# Patient Record
Sex: MALE | Race: ASIAN
[De-identification: names, ages, dates, MRNs, and addresses within clinical notes are randomized per-mention and may not be internally consistent; named-entity substitution may affect disease eponyms.]

---

## 2020-12-18 ENCOUNTER — HOSPITAL ENCOUNTER (INPATIENT)
Dept: HOSPITAL 8 - ED | Age: 20
LOS: 12 days | Discharge: HOME | DRG: 853 | End: 2020-12-30
Attending: INTERNAL MEDICINE | Admitting: HOSPITALIST
Payer: COMMERCIAL

## 2020-12-18 VITALS — WEIGHT: 94.8 LBS | BODY MASS INDEX: 15.24 KG/M2 | HEIGHT: 66 IN

## 2020-12-18 DIAGNOSIS — Z82.49: ICD-10-CM

## 2020-12-18 DIAGNOSIS — J18.8: ICD-10-CM

## 2020-12-18 DIAGNOSIS — A41.9: Primary | ICD-10-CM

## 2020-12-18 DIAGNOSIS — C80.1: ICD-10-CM

## 2020-12-18 DIAGNOSIS — C79.9: ICD-10-CM

## 2020-12-18 DIAGNOSIS — Z94.81: ICD-10-CM

## 2020-12-18 DIAGNOSIS — Z87.74: ICD-10-CM

## 2020-12-18 DIAGNOSIS — Z20.828: ICD-10-CM

## 2020-12-18 DIAGNOSIS — D49.89: ICD-10-CM

## 2020-12-18 DIAGNOSIS — C83.30: ICD-10-CM

## 2020-12-18 DIAGNOSIS — D64.9: ICD-10-CM

## 2020-12-18 LAB
<PLATELET ESTIMATE>: ADEQUATE
<PLT MORPHOLOGY>: (no result)
ALBUMIN SERPL-MCNC: 2 G/DL (ref 3.4–5)
ALP SERPL-CCNC: 57 U/L (ref 45–117)
ALT SERPL-CCNC: 11 U/L (ref 12–78)
ANION GAP SERPL CALC-SCNC: 6 MMOL/L (ref 5–15)
ANISOCYTOSIS BLD QL SMEAR: (no result)
BAND#(MANUAL): 1.3 X10^3/UL
BILIRUB SERPL-MCNC: 0.3 MG/DL (ref 0.2–1)
CALCIUM SERPL-MCNC: 7.6 MG/DL (ref 8.5–10.1)
CHLORIDE SERPL-SCNC: 110 MMOL/L (ref 98–107)
CREAT SERPL-MCNC: 0.75 MG/DL (ref 0.7–1.3)
DOHLE BOD BLD QL SMEAR: (no result)
ERYTHROCYTE [DISTWIDTH] IN BLOOD BY AUTOMATED COUNT: 12.3 % (ref 9.4–14.8)
LYMPH#(MANUAL): 0.13 X10^3/UL (ref 1–6.1)
LYMPHS% (MANUAL): 2 % (ref 22–44)
MCH RBC QN AUTO: 27.9 PG (ref 27.5–34.5)
MCHC RBC AUTO-ENTMCNC: 33.1 G/DL (ref 33.2–36.2)
MD: YES
METAMYELOCYTES# (MANUAL): 0.39 X10^3/UL (ref 0–0)
METAMYELOCYTES% (MANUAL): 6 % (ref 0–1)
MICROSCOPIC: (no result)
MONOS#(MANUAL): 0.59 X10^3/UL (ref 0.3–2.7)
MONOS% (MANUAL): 9 % (ref 2–9)
NEUTS BAND NFR BLD: 20 % (ref 0–7)
PLATELET # BLD AUTO: 315 X10^3/UL (ref 130–400)
PMV BLD AUTO: 7.1 FL (ref 7.4–10.4)
POLYCHROMASIA BLD QL SMEAR: (no result)
PROT SERPL-MCNC: 4.7 G/DL (ref 6.4–8.2)
RBC # BLD AUTO: 4.38 X10^6/UL (ref 4.38–5.82)
REACTIVE LYMPHS # (MANUAL): 0.2 X10^3/UL (ref 0–0)
REACTIVE LYMPHS % (MANUAL): 3 % (ref 0–0)
SEG#(MANUAL): 3.9 X10^3/UL (ref 1.8–8)
SEGS% (MANUAL): 60 % (ref 42–75)

## 2020-12-18 PROCEDURE — 88305 TISSUE EXAM BY PATHOLOGIST: CPT

## 2020-12-18 PROCEDURE — 36415 COLL VENOUS BLD VENIPUNCTURE: CPT

## 2020-12-18 PROCEDURE — 88341 IMHCHEM/IMCYTCHM EA ADD ANTB: CPT

## 2020-12-18 PROCEDURE — 77012 CT SCAN FOR NEEDLE BIOPSY: CPT

## 2020-12-18 PROCEDURE — 88280 CHROMOSOME KARYOTYPE STUDY: CPT

## 2020-12-18 PROCEDURE — 87324 CLOSTRIDIUM AG IA: CPT

## 2020-12-18 PROCEDURE — 88264 CHROMOSOME ANALYSIS 20-25: CPT

## 2020-12-18 PROCEDURE — 70551 MRI BRAIN STEM W/O DYE: CPT

## 2020-12-18 PROCEDURE — 89051 BODY FLUID CELL COUNT: CPT

## 2020-12-18 PROCEDURE — 86140 C-REACTIVE PROTEIN: CPT

## 2020-12-18 PROCEDURE — 93306 TTE W/DOPPLER COMPLETE: CPT

## 2020-12-18 PROCEDURE — 87517 HEPATITIS B DNA QUANT: CPT

## 2020-12-18 PROCEDURE — 83735 ASSAY OF MAGNESIUM: CPT

## 2020-12-18 PROCEDURE — 80074 ACUTE HEPATITIS PANEL: CPT

## 2020-12-18 PROCEDURE — 83615 LACTATE (LD) (LDH) ENZYME: CPT

## 2020-12-18 PROCEDURE — 71045 X-RAY EXAM CHEST 1 VIEW: CPT

## 2020-12-18 PROCEDURE — 36573 INSJ PICC RS&I 5 YR+: CPT

## 2020-12-18 PROCEDURE — 88342 IMHCHEM/IMCYTCHM 1ST ANTB: CPT

## 2020-12-18 PROCEDURE — 82272 OCCULT BLD FECES 1-3 TESTS: CPT

## 2020-12-18 PROCEDURE — 62328 DX LMBR SPI PNXR W/FLUOR/CT: CPT

## 2020-12-18 PROCEDURE — 71275 CT ANGIOGRAPHY CHEST: CPT

## 2020-12-18 PROCEDURE — 81003 URINALYSIS AUTO W/O SCOPE: CPT

## 2020-12-18 PROCEDURE — 99156 MOD SED OTH PHYS/QHP 5/>YRS: CPT

## 2020-12-18 PROCEDURE — 84100 ASSAY OF PHOSPHORUS: CPT

## 2020-12-18 PROCEDURE — 84157 ASSAY OF PROTEIN OTHER: CPT

## 2020-12-18 PROCEDURE — 70543 MRI ORBT/FAC/NCK W/O &W/DYE: CPT

## 2020-12-18 PROCEDURE — 85651 RBC SED RATE NONAUTOMATED: CPT

## 2020-12-18 PROCEDURE — 84550 ASSAY OF BLOOD/URIC ACID: CPT

## 2020-12-18 PROCEDURE — C1751 CATH, INF, PER/CENT/MIDLINE: HCPCS

## 2020-12-18 PROCEDURE — 85610 PROTHROMBIN TIME: CPT

## 2020-12-18 PROCEDURE — A9575 INJ GADOTERATE MEGLUMI 0.1ML: HCPCS

## 2020-12-18 PROCEDURE — 88311 DECALCIFY TISSUE: CPT

## 2020-12-18 PROCEDURE — 85730 THROMBOPLASTIN TIME PARTIAL: CPT

## 2020-12-18 PROCEDURE — 85097 BONE MARROW INTERPRETATION: CPT

## 2020-12-18 PROCEDURE — 87040 BLOOD CULTURE FOR BACTERIA: CPT

## 2020-12-18 PROCEDURE — 88360 TUMOR IMMUNOHISTOCHEM/MANUAL: CPT

## 2020-12-18 PROCEDURE — 83605 ASSAY OF LACTIC ACID: CPT

## 2020-12-18 PROCEDURE — 82105 ALPHA-FETOPROTEIN SERUM: CPT

## 2020-12-18 PROCEDURE — G0475 HIV COMBINATION ASSAY: HCPCS

## 2020-12-18 PROCEDURE — 85060 BLOOD SMEAR INTERPRETATION: CPT

## 2020-12-18 PROCEDURE — 38222 DX BONE MARROW BX & ASPIR: CPT

## 2020-12-18 PROCEDURE — 10005 FNA BX W/US GDN 1ST LES: CPT

## 2020-12-18 PROCEDURE — 87806 HIV AG W/HIV1&2 ANTB W/OPTIC: CPT

## 2020-12-18 PROCEDURE — 85025 COMPLETE CBC W/AUTO DIFF WBC: CPT

## 2020-12-18 PROCEDURE — 84702 CHORIONIC GONADOTROPIN TEST: CPT

## 2020-12-18 PROCEDURE — U0003 INFECTIOUS AGENT DETECTION BY NUCLEIC ACID (DNA OR RNA); SEVERE ACUTE RESPIRATORY SYNDROME CORONAVIRUS 2 (SARS-COV-2) (CORONAVIRUS DISEASE [COVID-19]), AMPLIFIED PROBE TECHNIQUE, MAKING USE OF HIGH THROUGHPUT TECHNOLOGIES AS DESCRIBED BY CMS-2020-01-R: HCPCS

## 2020-12-18 PROCEDURE — 99157 MOD SED OTHER PHYS/QHP EA: CPT

## 2020-12-18 PROCEDURE — 85379 FIBRIN DEGRADATION QUANT: CPT

## 2020-12-18 PROCEDURE — 88313 SPECIAL STAINS GROUP 2: CPT

## 2020-12-18 PROCEDURE — 80053 COMPREHEN METABOLIC PANEL: CPT

## 2020-12-18 PROCEDURE — 88237 TISSUE CULTURE BONE MARROW: CPT

## 2020-12-18 PROCEDURE — 70481 CT ORBIT/EAR/FOSSA W/DYE: CPT

## 2020-12-18 PROCEDURE — 84145 PROCALCITONIN (PCT): CPT

## 2020-12-18 PROCEDURE — 88108 CYTOPATH CONCENTRATE TECH: CPT

## 2020-12-18 PROCEDURE — 80048 BASIC METABOLIC PNL TOTAL CA: CPT

## 2020-12-18 PROCEDURE — 82945 GLUCOSE OTHER FLUID: CPT

## 2020-12-18 RX ADMIN — CEFTRIAXONE SCH MLS/HR: 1 INJECTION, SOLUTION INTRAVENOUS at 20:00

## 2020-12-18 RX ADMIN — AZITHROMYCIN FOR INJECTION INJECTION, POWDER, LYOPHILIZED, FOR SOLUTION SCH MLS/HR: 500 INJECTION INTRAVENOUS at 20:00

## 2020-12-18 RX ADMIN — SODIUM CHLORIDE SCH MLS/HR: 0.9 INJECTION, SOLUTION INTRAVENOUS at 20:47

## 2020-12-18 NOTE — NUR
PT RESTING IN BED, PT ON MONITOR WITH PT FAMILY AT PT SIDE. PT VSS. PT DENIED 
ANY WANTS OR NEEDS AT THIS TIME.

## 2020-12-18 NOTE — NUR
PT RESTING IN BED WITH PT FAMILY AT HIS SIDE. PT IS IN BATHROOM NOW TRYING TO 
PROVIDE URINE SAMPLE.

## 2020-12-19 LAB
<PLATELET ESTIMATE>: ADEQUATE
<PLT MORPHOLOGY>: (no result)
ANION GAP SERPL CALC-SCNC: 6 MMOL/L (ref 5–15)
ANISOCYTOSIS BLD QL SMEAR: (no result)
BAND#(MANUAL): 1.08 X10^3/UL
CALCIUM SERPL-MCNC: 8.2 MG/DL (ref 8.5–10.1)
CHLORIDE SERPL-SCNC: 109 MMOL/L (ref 98–107)
CREAT SERPL-MCNC: 0.65 MG/DL (ref 0.7–1.3)
CRP SERPL-MCNC: 6.7 MG/DL (ref 0.02–0.49)
D DIMER PPP FEU-MCNC: 8.22 UG/MLFEU (ref 0–0.52)
DOHLE BOD BLD QL SMEAR: (no result)
ERYTHROCYTE [DISTWIDTH] IN BLOOD BY AUTOMATED COUNT: 12.4 % (ref 9.4–14.8)
INR PPP: 1.11 (ref 0.93–1.1)
LYMPH#(MANUAL): 0.3 X10^3/UL (ref 1–6.1)
LYMPHS% (MANUAL): 5 % (ref 22–44)
MCH RBC QN AUTO: 28.7 PG (ref 27.5–34.5)
MCHC RBC AUTO-ENTMCNC: 34.2 G/DL (ref 33.2–36.2)
MD: YES
METAMYELOCYTES# (MANUAL): 0.12 X10^3/UL (ref 0–0)
METAMYELOCYTES% (MANUAL): 2 % (ref 0–1)
MONOS#(MANUAL): 0.54 X10^3/UL (ref 0.3–2.7)
MONOS% (MANUAL): 9 % (ref 2–9)
NEUTS BAND NFR BLD: 18 % (ref 0–7)
PLATELET # BLD AUTO: 330 X10^3/UL (ref 130–400)
PMV BLD AUTO: 7.3 FL (ref 7.4–10.4)
POLYCHROMASIA BLD QL SMEAR: (no result)
PROTHROMBIN TIME: 11.8 SECONDS (ref 9.6–11.5)
RBC # BLD AUTO: 3.99 X10^6/UL (ref 4.38–5.82)
REACTIVE LYMPHS # (MANUAL): 0.18 X10^3/UL (ref 0–0)
REACTIVE LYMPHS % (MANUAL): 3 % (ref 0–0)
SEG#(MANUAL): 3.78 X10^3/UL (ref 1.8–8)
SEGS% (MANUAL): 63 % (ref 42–75)

## 2020-12-19 PROCEDURE — 07B23ZX EXCISION OF LEFT NECK LYMPHATIC, PERCUTANEOUS APPROACH, DIAGNOSTIC: ICD-10-PCS | Performed by: RADIOLOGY

## 2020-12-19 RX ADMIN — SODIUM CHLORIDE, SODIUM LACTATE, POTASSIUM CHLORIDE, AND CALCIUM CHLORIDE SCH MLS/HR: .6; .31; .03; .02 INJECTION, SOLUTION INTRAVENOUS at 17:21

## 2020-12-19 RX ADMIN — CEFTRIAXONE SCH MLS/HR: 1 INJECTION, SOLUTION INTRAVENOUS at 20:14

## 2020-12-19 RX ADMIN — AZITHROMYCIN FOR INJECTION INJECTION, POWDER, LYOPHILIZED, FOR SOLUTION SCH MLS/HR: 500 INJECTION INTRAVENOUS at 20:00

## 2020-12-19 RX ADMIN — AMPICILLIN SODIUM AND SULBACTAM SODIUM SCH MLS/HR: 2; 1 INJECTION, POWDER, FOR SOLUTION INTRAMUSCULAR; INTRAVENOUS at 12:30

## 2020-12-19 RX ADMIN — SODIUM CHLORIDE SCH MLS/HR: 0.9 INJECTION, SOLUTION INTRAVENOUS at 06:00

## 2020-12-19 RX ADMIN — AMPICILLIN SODIUM AND SULBACTAM SODIUM SCH MLS/HR: 2; 1 INJECTION, POWDER, FOR SOLUTION INTRAMUSCULAR; INTRAVENOUS at 18:30

## 2020-12-19 RX ADMIN — SODIUM CHLORIDE, SODIUM LACTATE, POTASSIUM CHLORIDE, AND CALCIUM CHLORIDE SCH MLS/HR: .6; .31; .03; .02 INJECTION, SOLUTION INTRAVENOUS at 10:39

## 2020-12-19 RX ADMIN — SODIUM CHLORIDE, SODIUM LACTATE, POTASSIUM CHLORIDE, AND CALCIUM CHLORIDE SCH MLS/HR: .6; .31; .03; .02 INJECTION, SOLUTION INTRAVENOUS at 20:30

## 2020-12-19 RX ADMIN — DOCUSATE SODIUM 50MG AND SENNOSIDES 8.6MG SCH TAB: 8.6; 5 TABLET, FILM COATED ORAL at 09:27

## 2020-12-19 NOTE — NUR
SPOKE WITH FAMILY AND PT AT LENGTH ABOUT HOSPITAL COURSE. HOSPITALIST [place]RIST[place] TO 
SPEAK WITH FAMILY AS WELL.

## 2020-12-19 NOTE — NUR
First contact with pt, pt back from br ambulatory. Connected back to b/p, pulse 
ox. Call bell in reach, ivf reconnected. Pt now NPO. AIDET provided, will 
continue to monitor.

## 2020-12-19 NOTE — NUR
PT DENIES PAIN AT THIS TIME. RIGHT SIDE OF FACE SWOLLEN. STATES HIS MOUTH IS 
DRY. AWAITING TO HEAR FROM IR IF BIOPSY TO BE DONE TODAY, NPO.

## 2020-12-20 VITALS — DIASTOLIC BLOOD PRESSURE: 69 MMHG | SYSTOLIC BLOOD PRESSURE: 115 MMHG

## 2020-12-20 VITALS — DIASTOLIC BLOOD PRESSURE: 67 MMHG | SYSTOLIC BLOOD PRESSURE: 103 MMHG

## 2020-12-20 LAB
<PLATELET ESTIMATE>: ADEQUATE
<PLT MORPHOLOGY>: (no result)
ANION GAP SERPL CALC-SCNC: 6 MMOL/L (ref 5–15)
ANISOCYTOSIS BLD QL SMEAR: (no result)
BAND#(MANUAL): 1.19 X10^3/UL
CALCIUM SERPL-MCNC: 8.3 MG/DL (ref 8.5–10.1)
CHLORIDE SERPL-SCNC: 105 MMOL/L (ref 98–107)
CREAT SERPL-MCNC: 0.76 MG/DL (ref 0.7–1.3)
DOHLE BOD BLD QL SMEAR: (no result)
ERYTHROCYTE [DISTWIDTH] IN BLOOD BY AUTOMATED COUNT: 12.9 % (ref 9.4–14.8)
LYMPH#(MANUAL): 0.53 X10^3/UL (ref 1–6.1)
LYMPHS% (MANUAL): 8 % (ref 22–44)
MCH RBC QN AUTO: 28.1 PG (ref 27.5–34.5)
MCHC RBC AUTO-ENTMCNC: 33.6 G/DL (ref 33.2–36.2)
MD: YES
METAMYELOCYTES# (MANUAL): 0.13 X10^3/UL (ref 0–0)
METAMYELOCYTES% (MANUAL): 2 % (ref 0–1)
MONOS#(MANUAL): 0.59 X10^3/UL (ref 0.3–2.7)
MONOS% (MANUAL): 9 % (ref 2–9)
NEUTS BAND NFR BLD: 18 % (ref 0–7)
PLATELET # BLD AUTO: 334 X10^3/UL (ref 130–400)
PMV BLD AUTO: 6.9 FL (ref 7.4–10.4)
POLYCHROMASIA BLD QL SMEAR: (no result)
RBC # BLD AUTO: 4.05 X10^6/UL (ref 4.38–5.82)
REACTIVE LYMPHS # (MANUAL): 0.13 X10^3/UL (ref 0–0)
REACTIVE LYMPHS % (MANUAL): 2 % (ref 0–0)
SEG#(MANUAL): 4.03 X10^3/UL (ref 1.8–8)
SEGS% (MANUAL): 61 % (ref 42–75)

## 2020-12-20 RX ADMIN — FLUTICASONE PROPIONATE SCH SPRAY: 50 SPRAY, METERED NASAL at 10:00

## 2020-12-20 RX ADMIN — AMPICILLIN SODIUM AND SULBACTAM SODIUM SCH MLS/HR: 2; 1 INJECTION, POWDER, FOR SOLUTION INTRAMUSCULAR; INTRAVENOUS at 18:13

## 2020-12-20 RX ADMIN — AMPICILLIN SODIUM AND SULBACTAM SODIUM SCH MLS/HR: 2; 1 INJECTION, POWDER, FOR SOLUTION INTRAMUSCULAR; INTRAVENOUS at 13:08

## 2020-12-20 RX ADMIN — AMPICILLIN SODIUM AND SULBACTAM SODIUM SCH MLS/HR: 2; 1 INJECTION, POWDER, FOR SOLUTION INTRAMUSCULAR; INTRAVENOUS at 00:53

## 2020-12-20 RX ADMIN — SODIUM CHLORIDE, SODIUM LACTATE, POTASSIUM CHLORIDE, AND CALCIUM CHLORIDE SCH MLS/HR: .6; .31; .03; .02 INJECTION, SOLUTION INTRAVENOUS at 01:30

## 2020-12-20 RX ADMIN — CEFTRIAXONE SCH MLS/HR: 1 INJECTION, SOLUTION INTRAVENOUS at 22:12

## 2020-12-20 RX ADMIN — AZITHROMYCIN FOR INJECTION INJECTION, POWDER, LYOPHILIZED, FOR SOLUTION SCH MLS/HR: 500 INJECTION INTRAVENOUS at 19:45

## 2020-12-20 RX ADMIN — AMPICILLIN SODIUM AND SULBACTAM SODIUM SCH MLS/HR: 2; 1 INJECTION, POWDER, FOR SOLUTION INTRAMUSCULAR; INTRAVENOUS at 06:42

## 2020-12-20 RX ADMIN — ACETAMINOPHEN PRN MG: 325 TABLET, FILM COATED ORAL at 20:58

## 2020-12-20 RX ADMIN — DOCUSATE SODIUM 50MG AND SENNOSIDES 8.6MG SCH TAB: 8.6; 5 TABLET, FILM COATED ORAL at 09:00

## 2020-12-20 NOTE — NUR
PT COMPLETED SHOWER AND ADL'S INDEPENDENTLY W/O DIFFICULTY.  RTD TO ROOM, 
BREAKFAST RAY PROVIDED.  CALL LIGHT W/I REACH

## 2020-12-21 VITALS — SYSTOLIC BLOOD PRESSURE: 105 MMHG | DIASTOLIC BLOOD PRESSURE: 68 MMHG

## 2020-12-21 VITALS — SYSTOLIC BLOOD PRESSURE: 96 MMHG | DIASTOLIC BLOOD PRESSURE: 64 MMHG

## 2020-12-21 VITALS — SYSTOLIC BLOOD PRESSURE: 111 MMHG | DIASTOLIC BLOOD PRESSURE: 69 MMHG

## 2020-12-21 VITALS — DIASTOLIC BLOOD PRESSURE: 65 MMHG | SYSTOLIC BLOOD PRESSURE: 100 MMHG

## 2020-12-21 RX ADMIN — AMPICILLIN SODIUM AND SULBACTAM SODIUM SCH MLS/HR: 2; 1 INJECTION, POWDER, FOR SOLUTION INTRAMUSCULAR; INTRAVENOUS at 00:07

## 2020-12-21 RX ADMIN — AMPICILLIN SODIUM AND SULBACTAM SODIUM SCH MLS/HR: 2; 1 INJECTION, POWDER, FOR SOLUTION INTRAMUSCULAR; INTRAVENOUS at 12:47

## 2020-12-21 RX ADMIN — AMPICILLIN SODIUM AND SULBACTAM SODIUM SCH MLS/HR: 2; 1 INJECTION, POWDER, FOR SOLUTION INTRAMUSCULAR; INTRAVENOUS at 17:51

## 2020-12-21 RX ADMIN — AMPICILLIN SODIUM AND SULBACTAM SODIUM SCH MLS/HR: 2; 1 INJECTION, POWDER, FOR SOLUTION INTRAMUSCULAR; INTRAVENOUS at 06:04

## 2020-12-21 RX ADMIN — FLUTICASONE PROPIONATE SCH SPRAY: 50 SPRAY, METERED NASAL at 10:11

## 2020-12-21 RX ADMIN — DOCUSATE SODIUM 50MG AND SENNOSIDES 8.6MG SCH TAB: 8.6; 5 TABLET, FILM COATED ORAL at 09:00

## 2020-12-22 VITALS — DIASTOLIC BLOOD PRESSURE: 52 MMHG | SYSTOLIC BLOOD PRESSURE: 97 MMHG

## 2020-12-22 VITALS — DIASTOLIC BLOOD PRESSURE: 64 MMHG | SYSTOLIC BLOOD PRESSURE: 98 MMHG

## 2020-12-22 VITALS — DIASTOLIC BLOOD PRESSURE: 70 MMHG | SYSTOLIC BLOOD PRESSURE: 110 MMHG

## 2020-12-22 VITALS — SYSTOLIC BLOOD PRESSURE: 100 MMHG | DIASTOLIC BLOOD PRESSURE: 62 MMHG

## 2020-12-22 LAB
<PLATELET ESTIMATE>: ADEQUATE
<PLT MORPHOLOGY>: (no result)
ANISOCYTOSIS BLD QL SMEAR: (no result)
BAND#(MANUAL): 1.14 X10^3/UL
BASOPHILS NFR BLD MANUAL: 1 % (ref 0–1)
BASOS#(MANUAL): 0.07 X10^3/UL (ref 0–0.3)
CLOSTRIDIUM DIFFICILE ANTIGEN: NEGATIVE
CLOSTRIDIUM DIFFICILE TOXIN: NEGATIVE
EOS#(MANUAL): 0.07 X10^3/UL (ref 0–0.8)
EOS% (MANUAL): 1 % (ref 1–7)
ERYTHROCYTE [DISTWIDTH] IN BLOOD BY AUTOMATED COUNT: 13 % (ref 9.4–14.8)
GFR SERPL CREATININE-BSD FRML MDRD: POSITIVE ML/MIN/{1.73_M2}
LYMPH#(MANUAL): 0.4 X10^3/UL (ref 1–6.1)
LYMPHS% (MANUAL): 6 % (ref 22–44)
MCH RBC QN AUTO: 28 PG (ref 27.5–34.5)
MCHC RBC AUTO-ENTMCNC: 33.1 G/DL (ref 33.2–36.2)
MD: YES
METAMYELOCYTES# (MANUAL): 0.07 X10^3/UL (ref 0–0)
METAMYELOCYTES% (MANUAL): 1 % (ref 0–1)
MONOS#(MANUAL): 0.34 X10^3/UL (ref 0.3–2.7)
MONOS% (MANUAL): 5 % (ref 2–9)
NEUTS BAND NFR BLD: 17 % (ref 0–7)
PLATELET # BLD AUTO: 365 X10^3/UL (ref 130–400)
PMV BLD AUTO: 7.4 FL (ref 7.4–10.4)
RBC # BLD AUTO: 4.25 X10^6/UL (ref 4.38–5.82)
SEG#(MANUAL): 4.62 X10^3/UL (ref 1.8–8)
SEGS% (MANUAL): 69 % (ref 42–75)

## 2020-12-22 RX ADMIN — DOCUSATE SODIUM 50MG AND SENNOSIDES 8.6MG SCH TAB: 8.6; 5 TABLET, FILM COATED ORAL at 09:00

## 2020-12-22 RX ADMIN — AMPICILLIN SODIUM AND SULBACTAM SODIUM SCH MLS/HR: 2; 1 INJECTION, POWDER, FOR SOLUTION INTRAMUSCULAR; INTRAVENOUS at 18:21

## 2020-12-22 RX ADMIN — LACTOBACILLUS TAB SCH TAB: TAB at 15:38

## 2020-12-22 RX ADMIN — FLUTICASONE PROPIONATE SCH SPRAY: 50 SPRAY, METERED NASAL at 09:09

## 2020-12-22 RX ADMIN — AMPICILLIN SODIUM AND SULBACTAM SODIUM SCH MLS/HR: 2; 1 INJECTION, POWDER, FOR SOLUTION INTRAMUSCULAR; INTRAVENOUS at 00:23

## 2020-12-22 RX ADMIN — ENOXAPARIN SODIUM SCH MG: 40 INJECTION SUBCUTANEOUS at 09:11

## 2020-12-22 RX ADMIN — AMPICILLIN SODIUM AND SULBACTAM SODIUM SCH MLS/HR: 2; 1 INJECTION, POWDER, FOR SOLUTION INTRAMUSCULAR; INTRAVENOUS at 12:38

## 2020-12-22 RX ADMIN — AMPICILLIN SODIUM AND SULBACTAM SODIUM SCH MLS/HR: 2; 1 INJECTION, POWDER, FOR SOLUTION INTRAMUSCULAR; INTRAVENOUS at 05:59

## 2020-12-22 RX ADMIN — LACTOBACILLUS TAB SCH TAB: TAB at 21:35

## 2020-12-23 VITALS — DIASTOLIC BLOOD PRESSURE: 61 MMHG | SYSTOLIC BLOOD PRESSURE: 99 MMHG

## 2020-12-23 VITALS — DIASTOLIC BLOOD PRESSURE: 66 MMHG | SYSTOLIC BLOOD PRESSURE: 97 MMHG

## 2020-12-23 VITALS — SYSTOLIC BLOOD PRESSURE: 100 MMHG | DIASTOLIC BLOOD PRESSURE: 65 MMHG

## 2020-12-23 LAB
<PLATELET ESTIMATE>: ADEQUATE
<PLT MORPHOLOGY>: (no result)
ALBUMIN SERPL-MCNC: 1.9 G/DL (ref 3.4–5)
ALP SERPL-CCNC: 58 U/L (ref 45–117)
ALT SERPL-CCNC: 17 U/L (ref 12–78)
ANION GAP SERPL CALC-SCNC: 3 MMOL/L (ref 5–15)
BAND#(MANUAL): 0.43 X10^3/UL
BILIRUB SERPL-MCNC: 0.5 MG/DL (ref 0.2–1)
CALCIUM SERPL-MCNC: 8.7 MG/DL (ref 8.5–10.1)
CHLORIDE SERPL-SCNC: 105 MMOL/L (ref 98–107)
CREAT SERPL-MCNC: 0.72 MG/DL (ref 0.7–1.3)
EOS#(MANUAL): 0.06 X10^3/UL (ref 0–0.8)
EOS% (MANUAL): 1 % (ref 1–7)
ERYTHROCYTE [DISTWIDTH] IN BLOOD BY AUTOMATED COUNT: 13 % (ref 9.4–14.8)
LYMPH#(MANUAL): 0.12 X10^3/UL (ref 1–6.1)
LYMPHS% (MANUAL): 2 % (ref 22–44)
MCH RBC QN AUTO: 28.5 PG (ref 27.5–34.5)
MCHC RBC AUTO-ENTMCNC: 34 G/DL (ref 33.2–36.2)
MD: YES
MONOS#(MANUAL): 0.43 X10^3/UL (ref 0.3–2.7)
MONOS% (MANUAL): 7 % (ref 2–9)
MYELOCYTES# (MANUAL): 0.12 X10^3/UL (ref 0–0)
MYELOCYTES% (MANUAL): 2 % (ref 0–0)
NEUTS BAND NFR BLD: 7 % (ref 0–7)
PLATELET # BLD AUTO: 341 X10^3/UL (ref 130–400)
PMV BLD AUTO: 7.3 FL (ref 7.4–10.4)
POLYCHROMASIA BLD QL SMEAR: (no result)
PROT SERPL-MCNC: 4.6 G/DL (ref 6.4–8.2)
RBC # BLD AUTO: 4.04 X10^6/UL (ref 4.38–5.82)
SEG#(MANUAL): 5.02 X10^3/UL (ref 1.8–8)
SEGS% (MANUAL): 81 % (ref 42–75)

## 2020-12-23 RX ADMIN — LACTOBACILLUS TAB SCH TAB: TAB at 09:41

## 2020-12-23 RX ADMIN — AMPICILLIN SODIUM AND SULBACTAM SODIUM SCH MLS/HR: 2; 1 INJECTION, POWDER, FOR SOLUTION INTRAMUSCULAR; INTRAVENOUS at 18:13

## 2020-12-23 RX ADMIN — AMPICILLIN SODIUM AND SULBACTAM SODIUM SCH MLS/HR: 2; 1 INJECTION, POWDER, FOR SOLUTION INTRAMUSCULAR; INTRAVENOUS at 23:58

## 2020-12-23 RX ADMIN — AMPICILLIN SODIUM AND SULBACTAM SODIUM SCH MLS/HR: 2; 1 INJECTION, POWDER, FOR SOLUTION INTRAMUSCULAR; INTRAVENOUS at 12:47

## 2020-12-23 RX ADMIN — AMPICILLIN SODIUM AND SULBACTAM SODIUM SCH MLS/HR: 2; 1 INJECTION, POWDER, FOR SOLUTION INTRAMUSCULAR; INTRAVENOUS at 06:31

## 2020-12-23 RX ADMIN — LACTOBACILLUS TAB SCH TAB: TAB at 19:45

## 2020-12-23 RX ADMIN — DOCUSATE SODIUM 50MG AND SENNOSIDES 8.6MG SCH TAB: 8.6; 5 TABLET, FILM COATED ORAL at 09:42

## 2020-12-23 RX ADMIN — AMPICILLIN SODIUM AND SULBACTAM SODIUM SCH MLS/HR: 2; 1 INJECTION, POWDER, FOR SOLUTION INTRAMUSCULAR; INTRAVENOUS at 00:21

## 2020-12-23 RX ADMIN — LACTOBACILLUS TAB SCH TAB: TAB at 16:58

## 2020-12-23 RX ADMIN — FLUTICASONE PROPIONATE SCH SPRAY: 50 SPRAY, METERED NASAL at 09:41

## 2020-12-23 RX ADMIN — ENOXAPARIN SODIUM SCH MG: 40 INJECTION SUBCUTANEOUS at 09:46

## 2020-12-24 VITALS — DIASTOLIC BLOOD PRESSURE: 66 MMHG | SYSTOLIC BLOOD PRESSURE: 101 MMHG

## 2020-12-24 VITALS — DIASTOLIC BLOOD PRESSURE: 68 MMHG | SYSTOLIC BLOOD PRESSURE: 104 MMHG

## 2020-12-24 VITALS — SYSTOLIC BLOOD PRESSURE: 110 MMHG | DIASTOLIC BLOOD PRESSURE: 73 MMHG

## 2020-12-24 VITALS — DIASTOLIC BLOOD PRESSURE: 63 MMHG | SYSTOLIC BLOOD PRESSURE: 105 MMHG

## 2020-12-24 PROCEDURE — 02HV33Z INSERTION OF INFUSION DEVICE INTO SUPERIOR VENA CAVA, PERCUTANEOUS APPROACH: ICD-10-PCS | Performed by: RADIOLOGY

## 2020-12-24 PROCEDURE — 07DR3ZX EXTRACTION OF ILIAC BONE MARROW, PERCUTANEOUS APPROACH, DIAGNOSTIC: ICD-10-PCS | Performed by: RADIOLOGY

## 2020-12-24 PROCEDURE — B548ZZA ULTRASONOGRAPHY OF SUPERIOR VENA CAVA, GUIDANCE: ICD-10-PCS | Performed by: RADIOLOGY

## 2020-12-24 PROCEDURE — B5181ZA FLUOROSCOPY OF SUPERIOR VENA CAVA USING LOW OSMOLAR CONTRAST, GUIDANCE: ICD-10-PCS | Performed by: RADIOLOGY

## 2020-12-24 RX ADMIN — LACTOBACILLUS TAB SCH TAB: TAB at 09:00

## 2020-12-24 RX ADMIN — LACTOBACILLUS TAB SCH TAB: TAB at 20:39

## 2020-12-24 RX ADMIN — ENOXAPARIN SODIUM SCH MG: 40 INJECTION SUBCUTANEOUS at 08:10

## 2020-12-24 RX ADMIN — ACETAMINOPHEN PRN MG: 325 TABLET, FILM COATED ORAL at 17:59

## 2020-12-24 RX ADMIN — AMPICILLIN SODIUM AND SULBACTAM SODIUM SCH MLS/HR: 2; 1 INJECTION, POWDER, FOR SOLUTION INTRAMUSCULAR; INTRAVENOUS at 12:07

## 2020-12-24 RX ADMIN — AMPICILLIN SODIUM AND SULBACTAM SODIUM SCH MLS/HR: 2; 1 INJECTION, POWDER, FOR SOLUTION INTRAMUSCULAR; INTRAVENOUS at 17:59

## 2020-12-24 RX ADMIN — LACTOBACILLUS TAB SCH TAB: TAB at 18:00

## 2020-12-24 RX ADMIN — FLUTICASONE PROPIONATE SCH SPRAY: 50 SPRAY, METERED NASAL at 09:00

## 2020-12-24 RX ADMIN — ALLOPURINOL SCH MG: 300 TABLET ORAL at 18:28

## 2020-12-24 RX ADMIN — DOCUSATE SODIUM 50MG AND SENNOSIDES 8.6MG SCH TAB: 8.6; 5 TABLET, FILM COATED ORAL at 09:00

## 2020-12-24 RX ADMIN — AMPICILLIN SODIUM AND SULBACTAM SODIUM SCH MLS/HR: 2; 1 INJECTION, POWDER, FOR SOLUTION INTRAMUSCULAR; INTRAVENOUS at 06:06

## 2020-12-25 VITALS — SYSTOLIC BLOOD PRESSURE: 102 MMHG | DIASTOLIC BLOOD PRESSURE: 60 MMHG

## 2020-12-25 VITALS — SYSTOLIC BLOOD PRESSURE: 119 MMHG | DIASTOLIC BLOOD PRESSURE: 67 MMHG

## 2020-12-25 VITALS — SYSTOLIC BLOOD PRESSURE: 112 MMHG | DIASTOLIC BLOOD PRESSURE: 65 MMHG

## 2020-12-25 VITALS — DIASTOLIC BLOOD PRESSURE: 66 MMHG | SYSTOLIC BLOOD PRESSURE: 107 MMHG

## 2020-12-25 LAB
<PLATELET ESTIMATE>: ADEQUATE
<PLT MORPHOLOGY>: (no result)
ALBUMIN SERPL-MCNC: 1.6 G/DL (ref 3.4–5)
ALP SERPL-CCNC: 52 U/L (ref 45–117)
ALT SERPL-CCNC: 14 U/L (ref 12–78)
ANION GAP SERPL CALC-SCNC: 5 MMOL/L (ref 5–15)
ANION GAP SERPL CALC-SCNC: 7 MMOL/L (ref 5–15)
ANISOCYTOSIS BLD QL SMEAR: (no result)
BAND#(MANUAL): 0.85 X10^3/UL
BILIRUB SERPL-MCNC: 0.2 MG/DL (ref 0.2–1)
CALCIUM SERPL-MCNC: 7.5 MG/DL (ref 8.5–10.1)
CALCIUM SERPL-MCNC: 7.7 MG/DL (ref 8.5–10.1)
CHLORIDE SERPL-SCNC: 105 MMOL/L (ref 98–107)
CHLORIDE SERPL-SCNC: 109 MMOL/L (ref 98–107)
CREAT SERPL-MCNC: 0.62 MG/DL (ref 0.7–1.3)
CREAT SERPL-MCNC: 0.75 MG/DL (ref 0.7–1.3)
ERYTHROCYTE [DISTWIDTH] IN BLOOD BY AUTOMATED COUNT: 13.2 % (ref 9.4–14.8)
LYMPH#(MANUAL): 0.55 X10^3/UL (ref 1–6.1)
LYMPHS% (MANUAL): 9 % (ref 22–44)
MCH RBC QN AUTO: 28.7 PG (ref 27.5–34.5)
MCHC RBC AUTO-ENTMCNC: 34.3 G/DL (ref 33.2–36.2)
MD: YES
MONOS#(MANUAL): 0.24 X10^3/UL (ref 0.3–2.7)
MONOS% (MANUAL): 4 % (ref 2–9)
NEUTS BAND NFR BLD: 14 % (ref 0–7)
PLATELET # BLD AUTO: 304 X10^3/UL (ref 130–400)
PMV BLD AUTO: 7.2 FL (ref 7.4–10.4)
PROT SERPL-MCNC: 4.1 G/DL (ref 6.4–8.2)
RBC # BLD AUTO: 3.46 X10^6/UL (ref 4.38–5.82)
SEG#(MANUAL): 4.45 X10^3/UL (ref 1.8–8)
SEGS% (MANUAL): 73 % (ref 42–75)

## 2020-12-25 RX ADMIN — ENTECAVIR SCH MG: 0.5 TABLET, FILM COATED ORAL at 09:14

## 2020-12-25 RX ADMIN — AMPICILLIN SODIUM AND SULBACTAM SODIUM SCH MLS/HR: 2; 1 INJECTION, POWDER, FOR SOLUTION INTRAMUSCULAR; INTRAVENOUS at 00:46

## 2020-12-25 RX ADMIN — SODIUM CHLORIDE SCH MLS/HR: 0.9 INJECTION, SOLUTION INTRAVENOUS at 09:29

## 2020-12-25 RX ADMIN — ACETAMINOPHEN PRN MG: 325 TABLET, FILM COATED ORAL at 03:34

## 2020-12-25 RX ADMIN — LACTOBACILLUS TAB SCH TAB: TAB at 23:10

## 2020-12-25 RX ADMIN — ETOPOSIDE SCH MLS/HR: 20 INJECTION INTRAVENOUS at 11:50

## 2020-12-25 RX ADMIN — AMPICILLIN SODIUM AND SULBACTAM SODIUM SCH MLS/HR: 2; 1 INJECTION, POWDER, FOR SOLUTION INTRAMUSCULAR; INTRAVENOUS at 19:19

## 2020-12-25 RX ADMIN — FLUTICASONE PROPIONATE SCH SPRAY: 50 SPRAY, METERED NASAL at 09:29

## 2020-12-25 RX ADMIN — SODIUM CHLORIDE SCH MLS/HR: 0.9 INJECTION, SOLUTION INTRAVENOUS at 19:25

## 2020-12-25 RX ADMIN — ENOXAPARIN SODIUM SCH MG: 40 INJECTION SUBCUTANEOUS at 09:16

## 2020-12-25 RX ADMIN — AMPICILLIN SODIUM AND SULBACTAM SODIUM SCH MLS/HR: 2; 1 INJECTION, POWDER, FOR SOLUTION INTRAMUSCULAR; INTRAVENOUS at 06:33

## 2020-12-25 RX ADMIN — ALLOPURINOL SCH MG: 300 TABLET ORAL at 09:15

## 2020-12-25 RX ADMIN — LACTOBACILLUS TAB SCH TAB: TAB at 09:15

## 2020-12-25 RX ADMIN — LACTOBACILLUS TAB SCH TAB: TAB at 16:38

## 2020-12-25 RX ADMIN — AMPICILLIN SODIUM AND SULBACTAM SODIUM SCH MLS/HR: 2; 1 INJECTION, POWDER, FOR SOLUTION INTRAMUSCULAR; INTRAVENOUS at 13:19

## 2020-12-25 RX ADMIN — DOCUSATE SODIUM 50MG AND SENNOSIDES 8.6MG SCH TAB: 8.6; 5 TABLET, FILM COATED ORAL at 09:30

## 2020-12-26 VITALS — DIASTOLIC BLOOD PRESSURE: 62 MMHG | SYSTOLIC BLOOD PRESSURE: 105 MMHG

## 2020-12-26 VITALS — DIASTOLIC BLOOD PRESSURE: 71 MMHG | SYSTOLIC BLOOD PRESSURE: 108 MMHG

## 2020-12-26 VITALS — DIASTOLIC BLOOD PRESSURE: 72 MMHG | SYSTOLIC BLOOD PRESSURE: 113 MMHG

## 2020-12-26 VITALS — SYSTOLIC BLOOD PRESSURE: 120 MMHG | DIASTOLIC BLOOD PRESSURE: 64 MMHG

## 2020-12-26 LAB
<PLATELET ESTIMATE>: ADEQUATE
<PLT MORPHOLOGY>: (no result)
ALBUMIN SERPL-MCNC: 1.8 G/DL (ref 3.4–5)
ALP SERPL-CCNC: 58 U/L (ref 45–117)
ALT SERPL-CCNC: 15 U/L (ref 12–78)
ANION GAP SERPL CALC-SCNC: 6 MMOL/L (ref 5–15)
ANISOCYTOSIS BLD QL SMEAR: (no result)
BAND#(MANUAL): 0.82 X10^3/UL
BILIRUB SERPL-MCNC: 0.2 MG/DL (ref 0.2–1)
CALCIUM SERPL-MCNC: 8.7 MG/DL (ref 8.5–10.1)
CHLORIDE SERPL-SCNC: 108 MMOL/L (ref 98–107)
CREAT SERPL-MCNC: 0.55 MG/DL (ref 0.7–1.3)
ERYTHROCYTE [DISTWIDTH] IN BLOOD BY AUTOMATED COUNT: 13.2 % (ref 9.4–14.8)
LYMPH#(MANUAL): 0.19 X10^3/UL (ref 1–6.1)
LYMPHS% (MANUAL): 3 % (ref 22–44)
MCH RBC QN AUTO: 28 PG (ref 27.5–34.5)
MCHC RBC AUTO-ENTMCNC: 33.6 G/DL (ref 33.2–36.2)
MD: YES
METAMYELOCYTES# (MANUAL): 0.06 X10^3/UL (ref 0–0)
METAMYELOCYTES% (MANUAL): 1 % (ref 0–1)
MONOS#(MANUAL): 0.19 X10^3/UL (ref 0.3–2.7)
MONOS% (MANUAL): 3 % (ref 2–9)
MYELOCYTES# (MANUAL): 0.06 X10^3/UL (ref 0–0)
MYELOCYTES% (MANUAL): 1 % (ref 0–0)
NEUTS BAND NFR BLD: 13 % (ref 0–7)
PLATELET # BLD AUTO: 329 X10^3/UL (ref 130–400)
PMV BLD AUTO: 7.3 FL (ref 7.4–10.4)
PROT SERPL-MCNC: 4.6 G/DL (ref 6.4–8.2)
RBC # BLD AUTO: 3.61 X10^6/UL (ref 4.38–5.82)
SEG#(MANUAL): 4.98 X10^3/UL (ref 1.8–8)
SEGS% (MANUAL): 79 % (ref 42–75)

## 2020-12-26 RX ADMIN — CEFEPIME SCH MLS/HR: 1 INJECTION, POWDER, FOR SOLUTION INTRAMUSCULAR; INTRAVENOUS at 16:18

## 2020-12-26 RX ADMIN — LACTOBACILLUS TAB SCH TAB: TAB at 16:18

## 2020-12-26 RX ADMIN — AMPICILLIN SODIUM AND SULBACTAM SODIUM SCH MLS/HR: 2; 1 INJECTION, POWDER, FOR SOLUTION INTRAMUSCULAR; INTRAVENOUS at 01:12

## 2020-12-26 RX ADMIN — DOCUSATE SODIUM 50MG AND SENNOSIDES 8.6MG SCH TAB: 8.6; 5 TABLET, FILM COATED ORAL at 08:52

## 2020-12-26 RX ADMIN — ONDANSETRON HYDROCHLORIDE SCH MLS/HR: 2 INJECTION, SOLUTION INTRAMUSCULAR; INTRAVENOUS at 10:51

## 2020-12-26 RX ADMIN — AMPICILLIN SODIUM AND SULBACTAM SODIUM SCH MLS/HR: 2; 1 INJECTION, POWDER, FOR SOLUTION INTRAMUSCULAR; INTRAVENOUS at 08:52

## 2020-12-26 RX ADMIN — LACTOBACILLUS TAB SCH TAB: TAB at 08:53

## 2020-12-26 RX ADMIN — SODIUM CHLORIDE SCH MLS/HR: 0.9 INJECTION, SOLUTION INTRAVENOUS at 16:18

## 2020-12-26 RX ADMIN — ENOXAPARIN SODIUM SCH MG: 40 INJECTION SUBCUTANEOUS at 08:51

## 2020-12-26 RX ADMIN — ALLOPURINOL SCH MG: 300 TABLET ORAL at 08:53

## 2020-12-26 RX ADMIN — LACTOBACILLUS TAB SCH TAB: TAB at 20:30

## 2020-12-26 RX ADMIN — SODIUM CHLORIDE SCH MLS/HR: 0.9 INJECTION, SOLUTION INTRAVENOUS at 05:47

## 2020-12-26 RX ADMIN — FLUTICASONE PROPIONATE SCH SPRAY: 50 SPRAY, METERED NASAL at 08:50

## 2020-12-26 RX ADMIN — ENTECAVIR SCH MG: 0.5 TABLET, FILM COATED ORAL at 08:53

## 2020-12-26 RX ADMIN — SODIUM CHLORIDE SCH MLS/HR: 0.9 INJECTION, SOLUTION INTRAVENOUS at 23:38

## 2020-12-26 RX ADMIN — ETOPOSIDE SCH MLS/HR: 20 INJECTION INTRAVENOUS at 13:45

## 2020-12-26 RX ADMIN — CEFEPIME SCH MLS/HR: 1 INJECTION, POWDER, FOR SOLUTION INTRAMUSCULAR; INTRAVENOUS at 23:35

## 2020-12-27 VITALS — SYSTOLIC BLOOD PRESSURE: 95 MMHG | DIASTOLIC BLOOD PRESSURE: 57 MMHG

## 2020-12-27 VITALS — SYSTOLIC BLOOD PRESSURE: 113 MMHG | DIASTOLIC BLOOD PRESSURE: 66 MMHG

## 2020-12-27 VITALS — DIASTOLIC BLOOD PRESSURE: 65 MMHG | SYSTOLIC BLOOD PRESSURE: 106 MMHG

## 2020-12-27 VITALS — DIASTOLIC BLOOD PRESSURE: 67 MMHG | SYSTOLIC BLOOD PRESSURE: 108 MMHG

## 2020-12-27 LAB
<PLATELET ESTIMATE>: ADEQUATE
<PLT MORPHOLOGY>: (no result)
ALBUMIN SERPL-MCNC: 1.7 G/DL (ref 3.4–5)
ALP SERPL-CCNC: 50 U/L (ref 45–117)
ALT SERPL-CCNC: 16 U/L (ref 12–78)
ANION GAP SERPL CALC-SCNC: 3 MMOL/L (ref 5–15)
ANISOCYTOSIS BLD QL SMEAR: (no result)
BAND#(MANUAL): 1.26 X10^3/UL
BILIRUB SERPL-MCNC: 0.3 MG/DL (ref 0.2–1)
CALCIUM SERPL-MCNC: 8.4 MG/DL (ref 8.5–10.1)
CHLORIDE SERPL-SCNC: 110 MMOL/L (ref 98–107)
CREAT SERPL-MCNC: 0.58 MG/DL (ref 0.7–1.3)
ERYTHROCYTE [DISTWIDTH] IN BLOOD BY AUTOMATED COUNT: 12.9 % (ref 9.4–14.8)
ERYTHROCYTE [SEDIMENTATION RATE] IN BLOOD BY WESTERGREN METHOD: 12 MM/HR (ref 0–10)
HCT (SEDRATE): 28.2 % (ref 39.2–51.8)
LYMPH#(MANUAL): 0.25 X10^3/UL (ref 1–6.1)
LYMPHS% (MANUAL): 4 % (ref 22–44)
MCH RBC QN AUTO: 28.3 PG (ref 27.5–34.5)
MCHC RBC AUTO-ENTMCNC: 33.8 G/DL (ref 33.2–36.2)
MD: YES
METAMYELOCYTES# (MANUAL): 0.06 X10^3/UL (ref 0–0)
METAMYELOCYTES% (MANUAL): 1 % (ref 0–1)
MONOS#(MANUAL): 0.25 X10^3/UL (ref 0.3–2.7)
MONOS% (MANUAL): 4 % (ref 2–9)
NEUTS BAND NFR BLD: 20 % (ref 0–7)
OVALOCYTES BLD QL SMEAR: (no result)
PLATELET # BLD AUTO: 362 X10^3/UL (ref 130–400)
PMV BLD AUTO: 7.2 FL (ref 7.4–10.4)
POLYCHROMASIA BLD QL SMEAR: (no result)
PROT SERPL-MCNC: 4.2 G/DL (ref 6.4–8.2)
RBC # BLD AUTO: 3.36 X10^6/UL (ref 4.38–5.82)
SEG#(MANUAL): 4.47 X10^3/UL (ref 1.8–8)
SEGS% (MANUAL): 71 % (ref 42–75)
TEAR DROPS: (no result)

## 2020-12-27 RX ADMIN — CEFEPIME SCH MLS/HR: 1 INJECTION, POWDER, FOR SOLUTION INTRAMUSCULAR; INTRAVENOUS at 09:48

## 2020-12-27 RX ADMIN — SODIUM CHLORIDE SCH MLS/HR: 0.9 INJECTION, SOLUTION INTRAVENOUS at 12:00

## 2020-12-27 RX ADMIN — ONDANSETRON HYDROCHLORIDE SCH MLS/HR: 2 INJECTION, SOLUTION INTRAMUSCULAR; INTRAVENOUS at 12:00

## 2020-12-27 RX ADMIN — LACTOBACILLUS TAB SCH TAB: TAB at 21:15

## 2020-12-27 RX ADMIN — LACTOBACILLUS TAB SCH TAB: TAB at 09:37

## 2020-12-27 RX ADMIN — ETOPOSIDE SCH MLS/HR: 20 INJECTION INTRAVENOUS at 14:06

## 2020-12-27 RX ADMIN — LACTOBACILLUS TAB SCH TAB: TAB at 17:20

## 2020-12-27 RX ADMIN — CEFEPIME SCH MLS/HR: 1 INJECTION, POWDER, FOR SOLUTION INTRAMUSCULAR; INTRAVENOUS at 17:15

## 2020-12-27 RX ADMIN — FLUTICASONE PROPIONATE SCH SPRAY: 50 SPRAY, METERED NASAL at 09:38

## 2020-12-27 RX ADMIN — ENOXAPARIN SODIUM SCH MG: 40 INJECTION SUBCUTANEOUS at 09:50

## 2020-12-27 RX ADMIN — SODIUM CHLORIDE SCH MLS/HR: 0.9 INJECTION, SOLUTION INTRAVENOUS at 22:26

## 2020-12-27 RX ADMIN — ALLOPURINOL SCH MG: 300 TABLET ORAL at 09:37

## 2020-12-27 RX ADMIN — ONDANSETRON PRN MG: 4 TABLET, ORALLY DISINTEGRATING ORAL at 18:30

## 2020-12-27 RX ADMIN — ENTECAVIR SCH MG: 0.5 TABLET, FILM COATED ORAL at 09:41

## 2020-12-27 RX ADMIN — DOCUSATE SODIUM 50MG AND SENNOSIDES 8.6MG SCH TAB: 8.6; 5 TABLET, FILM COATED ORAL at 09:39

## 2020-12-28 VITALS — SYSTOLIC BLOOD PRESSURE: 111 MMHG | DIASTOLIC BLOOD PRESSURE: 56 MMHG

## 2020-12-28 VITALS — SYSTOLIC BLOOD PRESSURE: 104 MMHG | DIASTOLIC BLOOD PRESSURE: 61 MMHG

## 2020-12-28 VITALS — SYSTOLIC BLOOD PRESSURE: 105 MMHG | DIASTOLIC BLOOD PRESSURE: 68 MMHG

## 2020-12-28 VITALS — DIASTOLIC BLOOD PRESSURE: 64 MMHG | SYSTOLIC BLOOD PRESSURE: 111 MMHG

## 2020-12-28 LAB
<PLATELET ESTIMATE>: ADEQUATE
<PLT MORPHOLOGY>: (no result)
ALBUMIN SERPL-MCNC: 1.8 G/DL (ref 3.4–5)
ALP SERPL-CCNC: 49 U/L (ref 45–117)
ALT SERPL-CCNC: 23 U/L (ref 12–78)
ANION GAP SERPL CALC-SCNC: 7 MMOL/L (ref 5–15)
ANISOCYTOSIS BLD QL SMEAR: (no result)
APTT BLD: 28 SECONDS (ref 25–31)
BAND#(MANUAL): 0.48 X10^3/UL
BILIRUB SERPL-MCNC: 0.3 MG/DL (ref 0.2–1)
CALCIUM SERPL-MCNC: 8.3 MG/DL (ref 8.5–10.1)
CHLORIDE SERPL-SCNC: 106 MMOL/L (ref 98–107)
CREAT SERPL-MCNC: 0.6 MG/DL (ref 0.7–1.3)
ERYTHROCYTE [DISTWIDTH] IN BLOOD BY AUTOMATED COUNT: 13.2 % (ref 9.4–14.8)
GLUCOSE CSF-MCNC: 51 MG/DL (ref 40–80)
INR PPP: 1.04 (ref 0.93–1.1)
LYMPH#(MANUAL): 0.13 X10^3/UL (ref 1–6.1)
LYMPHS% (MANUAL): 3 % (ref 22–44)
MCH RBC QN AUTO: 28.4 PG (ref 27.5–34.5)
MCHC RBC AUTO-ENTMCNC: 34.1 G/DL (ref 33.2–36.2)
MD: YES
METAMYELOCYTES# (MANUAL): 0.09 X10^3/UL (ref 0–0)
METAMYELOCYTES% (MANUAL): 2 % (ref 0–1)
MONOS#(MANUAL): 0.26 X10^3/UL (ref 0.3–2.7)
MONOS% (MANUAL): 6 % (ref 2–9)
NEUTS BAND NFR BLD: 11 % (ref 0–7)
OVALOCYTES BLD QL SMEAR: (no result)
PLATELET # BLD AUTO: 368 X10^3/UL (ref 130–400)
PMV BLD AUTO: 6.7 FL (ref 7.4–10.4)
POLYCHROMASIA BLD QL SMEAR: (no result)
PROT SERPL-MCNC: 4.2 G/DL (ref 6.4–8.2)
PROTHROMBIN TIME: 11 SECONDS (ref 9.6–11.5)
RBC # BLD AUTO: 3.39 X10^6/UL (ref 4.38–5.82)
SEG#(MANUAL): 3.43 X10^3/UL (ref 1.8–8)
SEGS% (MANUAL): 78 % (ref 42–75)
TOTAL PROTEIN,CSF: 23 MG/DL (ref 15–45)

## 2020-12-28 PROCEDURE — 009U3ZX DRAINAGE OF SPINAL CANAL, PERCUTANEOUS APPROACH, DIAGNOSTIC: ICD-10-PCS | Performed by: RADIOLOGY

## 2020-12-28 PROCEDURE — B01B1ZZ FLUOROSCOPY OF SPINAL CORD USING LOW OSMOLAR CONTRAST: ICD-10-PCS | Performed by: RADIOLOGY

## 2020-12-28 RX ADMIN — CEFEPIME SCH MLS/HR: 1 INJECTION, POWDER, FOR SOLUTION INTRAMUSCULAR; INTRAVENOUS at 08:43

## 2020-12-28 RX ADMIN — SODIUM CHLORIDE SCH MLS/HR: 0.9 INJECTION, SOLUTION INTRAVENOUS at 08:49

## 2020-12-28 RX ADMIN — POTASSIUM CHLORIDE SCH MEQ: 20 TABLET, EXTENDED RELEASE ORAL at 16:09

## 2020-12-28 RX ADMIN — ONDANSETRON HYDROCHLORIDE SCH MLS/HR: 2 INJECTION, SOLUTION INTRAMUSCULAR; INTRAVENOUS at 14:31

## 2020-12-28 RX ADMIN — ALLOPURINOL SCH MG: 300 TABLET ORAL at 08:43

## 2020-12-28 RX ADMIN — POTASSIUM CHLORIDE SCH MEQ: 20 TABLET, EXTENDED RELEASE ORAL at 17:50

## 2020-12-28 RX ADMIN — DOCUSATE SODIUM 50MG AND SENNOSIDES 8.6MG SCH TAB: 8.6; 5 TABLET, FILM COATED ORAL at 08:43

## 2020-12-28 RX ADMIN — ACETAMINOPHEN PRN MG: 325 TABLET, FILM COATED ORAL at 17:50

## 2020-12-28 RX ADMIN — LACTOBACILLUS TAB SCH TAB: TAB at 16:09

## 2020-12-28 RX ADMIN — LACTOBACILLUS TAB SCH TAB: TAB at 21:17

## 2020-12-28 RX ADMIN — LACTOBACILLUS TAB SCH TAB: TAB at 08:43

## 2020-12-28 RX ADMIN — SODIUM CHLORIDE SCH MLS/HR: 0.9 INJECTION, SOLUTION INTRAVENOUS at 20:41

## 2020-12-28 RX ADMIN — ONDANSETRON PRN MG: 4 TABLET, ORALLY DISINTEGRATING ORAL at 08:43

## 2020-12-28 RX ADMIN — CEFEPIME SCH MLS/HR: 1 INJECTION, POWDER, FOR SOLUTION INTRAMUSCULAR; INTRAVENOUS at 16:20

## 2020-12-28 RX ADMIN — CEFEPIME SCH MLS/HR: 1 INJECTION, POWDER, FOR SOLUTION INTRAMUSCULAR; INTRAVENOUS at 00:07

## 2020-12-28 RX ADMIN — ETOPOSIDE SCH MLS/HR: 20 INJECTION INTRAVENOUS at 15:14

## 2020-12-28 RX ADMIN — ENTECAVIR SCH MG: 0.5 TABLET, FILM COATED ORAL at 08:51

## 2020-12-28 RX ADMIN — FLUTICASONE PROPIONATE SCH SPRAY: 50 SPRAY, METERED NASAL at 08:44

## 2020-12-29 VITALS — SYSTOLIC BLOOD PRESSURE: 106 MMHG | DIASTOLIC BLOOD PRESSURE: 63 MMHG

## 2020-12-29 VITALS — SYSTOLIC BLOOD PRESSURE: 99 MMHG | DIASTOLIC BLOOD PRESSURE: 61 MMHG

## 2020-12-29 VITALS — DIASTOLIC BLOOD PRESSURE: 65 MMHG | SYSTOLIC BLOOD PRESSURE: 110 MMHG

## 2020-12-29 VITALS — SYSTOLIC BLOOD PRESSURE: 100 MMHG | DIASTOLIC BLOOD PRESSURE: 60 MMHG

## 2020-12-29 LAB
<PLATELET ESTIMATE>: ADEQUATE
<PLT MORPHOLOGY>: (no result)
ALBUMIN SERPL-MCNC: 1.9 G/DL (ref 3.4–5)
ALP SERPL-CCNC: 70 U/L (ref 45–117)
ALT SERPL-CCNC: 40 U/L (ref 12–78)
ANION GAP SERPL CALC-SCNC: 5 MMOL/L (ref 5–15)
ANISOCYTOSIS BLD QL SMEAR: (no result)
BAND#(MANUAL): 0.36 X10^3/UL
BILIRUB SERPL-MCNC: 0.2 MG/DL (ref 0.2–1)
CALCIUM SERPL-MCNC: 7.9 MG/DL (ref 8.5–10.1)
CHLORIDE SERPL-SCNC: 106 MMOL/L (ref 98–107)
CREAT SERPL-MCNC: 0.56 MG/DL (ref 0.7–1.3)
ERYTHROCYTE [DISTWIDTH] IN BLOOD BY AUTOMATED COUNT: 13.3 % (ref 9.4–14.8)
LYMPH#(MANUAL): 0.1 X10^3/UL (ref 1–6.1)
LYMPHS% (MANUAL): 3 % (ref 22–44)
MCH RBC QN AUTO: 27.6 PG (ref 27.5–34.5)
MCHC RBC AUTO-ENTMCNC: 33.5 G/DL (ref 33.2–36.2)
MD: YES
METAMYELOCYTES# (MANUAL): 0.1 X10^3/UL (ref 0–0)
METAMYELOCYTES% (MANUAL): 3 % (ref 0–1)
MONOS#(MANUAL): 0.07 X10^3/UL (ref 0.3–2.7)
MONOS% (MANUAL): 2 % (ref 2–9)
MYELOCYTES# (MANUAL): 0.03 X10^3/UL (ref 0–0)
MYELOCYTES% (MANUAL): 1 % (ref 0–0)
NEUTS BAND NFR BLD: 11 % (ref 0–7)
OVALOCYTES BLD QL SMEAR: (no result)
PLATELET # BLD AUTO: 339 X10^3/UL (ref 130–400)
PMV BLD AUTO: 6.9 FL (ref 7.4–10.4)
POLYCHROMASIA BLD QL SMEAR: (no result)
PROT SERPL-MCNC: 4.4 G/DL (ref 6.4–8.2)
RBC # BLD AUTO: 3.45 X10^6/UL (ref 4.38–5.82)
SEG#(MANUAL): 2.64 X10^3/UL (ref 1.8–8)
SEGS% (MANUAL): 80 % (ref 42–75)
TEAR DROPS: (no result)

## 2020-12-29 RX ADMIN — SODIUM CHLORIDE SCH MLS/HR: 0.9 INJECTION, SOLUTION INTRAVENOUS at 19:46

## 2020-12-29 RX ADMIN — CEFEPIME SCH MLS/HR: 1 INJECTION, POWDER, FOR SOLUTION INTRAMUSCULAR; INTRAVENOUS at 08:07

## 2020-12-29 RX ADMIN — ACETAMINOPHEN PRN MG: 325 TABLET, FILM COATED ORAL at 08:06

## 2020-12-29 RX ADMIN — LACTOBACILLUS TAB SCH TAB: TAB at 08:07

## 2020-12-29 RX ADMIN — LACTOBACILLUS TAB SCH TAB: TAB at 15:28

## 2020-12-29 RX ADMIN — DOCUSATE SODIUM 50MG AND SENNOSIDES 8.6MG SCH TAB: 8.6; 5 TABLET, FILM COATED ORAL at 08:06

## 2020-12-29 RX ADMIN — CEFEPIME SCH MLS/HR: 1 INJECTION, POWDER, FOR SOLUTION INTRAMUSCULAR; INTRAVENOUS at 15:29

## 2020-12-29 RX ADMIN — ENTECAVIR SCH MG: 0.5 TABLET, FILM COATED ORAL at 08:07

## 2020-12-29 RX ADMIN — CEFEPIME SCH MLS/HR: 1 INJECTION, POWDER, FOR SOLUTION INTRAMUSCULAR; INTRAVENOUS at 00:00

## 2020-12-29 RX ADMIN — SODIUM CHLORIDE SCH MLS/HR: 0.9 INJECTION, SOLUTION INTRAVENOUS at 08:07

## 2020-12-29 RX ADMIN — LACTOBACILLUS TAB SCH TAB: TAB at 22:14

## 2020-12-29 RX ADMIN — FLUTICASONE PROPIONATE SCH SPRAY: 50 SPRAY, METERED NASAL at 08:08

## 2020-12-29 RX ADMIN — ALLOPURINOL SCH MG: 300 TABLET ORAL at 08:07

## 2020-12-29 RX ADMIN — FLUTICASONE PROPIONATE SCH SPRAY: 50 SPRAY, METERED NASAL at 08:09

## 2020-12-29 RX ADMIN — ACETAMINOPHEN PRN MG: 325 TABLET, FILM COATED ORAL at 19:54

## 2020-12-30 VITALS — DIASTOLIC BLOOD PRESSURE: 64 MMHG | SYSTOLIC BLOOD PRESSURE: 106 MMHG

## 2020-12-30 VITALS — SYSTOLIC BLOOD PRESSURE: 112 MMHG | DIASTOLIC BLOOD PRESSURE: 67 MMHG

## 2020-12-30 VITALS — DIASTOLIC BLOOD PRESSURE: 61 MMHG | SYSTOLIC BLOOD PRESSURE: 99 MMHG

## 2020-12-30 LAB
<PLATELET ESTIMATE>: ADEQUATE
<PLT MORPHOLOGY>: (no result)
ALBUMIN SERPL-MCNC: 1.9 G/DL (ref 3.4–5)
ALP SERPL-CCNC: 70 U/L (ref 45–117)
ALT SERPL-CCNC: 69 U/L (ref 12–78)
ANION GAP SERPL CALC-SCNC: 4 MMOL/L (ref 5–15)
ANISOCYTOSIS BLD QL SMEAR: (no result)
BAND#(MANUAL): 0.58 X10^3/UL
BILIRUB SERPL-MCNC: 0.3 MG/DL (ref 0.2–1)
CALCIUM SERPL-MCNC: 8 MG/DL (ref 8.5–10.1)
CHLORIDE SERPL-SCNC: 106 MMOL/L (ref 98–107)
CREAT SERPL-MCNC: 0.57 MG/DL (ref 0.7–1.3)
ERYTHROCYTE [DISTWIDTH] IN BLOOD BY AUTOMATED COUNT: 12.9 % (ref 9.4–14.8)
LYMPH#(MANUAL): 0.1 X10^3/UL (ref 1–6.1)
LYMPHS% (MANUAL): 3 % (ref 22–44)
MCH RBC QN AUTO: 27.6 PG (ref 27.5–34.5)
MCHC RBC AUTO-ENTMCNC: 33.4 G/DL (ref 33.2–36.2)
MD: YES
MONOS#(MANUAL): 0.03 X10^3/UL (ref 0.3–2.7)
MONOS% (MANUAL): 1 % (ref 2–9)
NEUTS BAND NFR BLD: 18 % (ref 0–7)
OVALOCYTES BLD QL SMEAR: (no result)
PLATELET # BLD AUTO: 325 X10^3/UL (ref 130–400)
PMV BLD AUTO: 7 FL (ref 7.4–10.4)
POLYCHROMASIA BLD QL SMEAR: (no result)
PROT SERPL-MCNC: 4.5 G/DL (ref 6.4–8.2)
RBC # BLD AUTO: 3.27 X10^6/UL (ref 4.38–5.82)
SEG#(MANUAL): 2.5 X10^3/UL (ref 1.8–8)
SEGS% (MANUAL): 78 % (ref 42–75)
TEAR DROPS: (no result)

## 2020-12-30 RX ADMIN — POTASSIUM CHLORIDE SCH MEQ: 20 TABLET, EXTENDED RELEASE ORAL at 10:57

## 2020-12-30 RX ADMIN — LACTOBACILLUS TAB SCH TAB: TAB at 08:44

## 2020-12-30 RX ADMIN — FLUTICASONE PROPIONATE SCH SPRAY: 50 SPRAY, METERED NASAL at 08:44

## 2020-12-30 RX ADMIN — ENTECAVIR SCH MG: 0.5 TABLET, FILM COATED ORAL at 10:05

## 2020-12-30 RX ADMIN — CEFEPIME SCH MLS/HR: 1 INJECTION, POWDER, FOR SOLUTION INTRAMUSCULAR; INTRAVENOUS at 00:34

## 2020-12-30 RX ADMIN — ALLOPURINOL SCH MG: 300 TABLET ORAL at 08:44

## 2020-12-30 RX ADMIN — POTASSIUM CHLORIDE SCH MEQ: 20 TABLET, EXTENDED RELEASE ORAL at 14:13

## 2020-12-30 RX ADMIN — DOCUSATE SODIUM 50MG AND SENNOSIDES 8.6MG SCH TAB: 8.6; 5 TABLET, FILM COATED ORAL at 08:44

## 2020-12-30 RX ADMIN — ONDANSETRON PRN MG: 4 TABLET, ORALLY DISINTEGRATING ORAL at 02:02

## 2020-12-30 RX ADMIN — LACTOBACILLUS TAB SCH TAB: TAB at 15:56

## 2020-12-30 RX ADMIN — CEFEPIME SCH MLS/HR: 1 INJECTION, POWDER, FOR SOLUTION INTRAMUSCULAR; INTRAVENOUS at 08:44

## 2020-12-30 RX ADMIN — SODIUM CHLORIDE SCH MLS/HR: 0.9 INJECTION, SOLUTION INTRAVENOUS at 05:23

## 2021-01-14 ENCOUNTER — HOSPITAL ENCOUNTER (OUTPATIENT)
Dept: HOSPITAL 8 - OUT | Age: 21
Discharge: HOME | End: 2021-01-14
Attending: INTERNAL MEDICINE
Payer: COMMERCIAL

## 2021-01-14 VITALS — BODY MASS INDEX: 16.3 KG/M2 | WEIGHT: 101.41 LBS | HEIGHT: 66 IN

## 2021-01-14 VITALS — SYSTOLIC BLOOD PRESSURE: 113 MMHG | DIASTOLIC BLOOD PRESSURE: 72 MMHG

## 2021-01-14 DIAGNOSIS — Z94.81: ICD-10-CM

## 2021-01-14 DIAGNOSIS — Z98.890: ICD-10-CM

## 2021-01-14 DIAGNOSIS — C83.30: Primary | ICD-10-CM

## 2021-01-14 DIAGNOSIS — Z79.899: ICD-10-CM

## 2021-01-14 PROCEDURE — C1788 PORT, INDWELLING, IMP: HCPCS

## 2021-01-14 PROCEDURE — 99156 MOD SED OTH PHYS/QHP 5/>YRS: CPT

## 2021-01-14 PROCEDURE — C1894 INTRO/SHEATH, NON-LASER: HCPCS

## 2021-01-14 PROCEDURE — 77001 FLUOROGUIDE FOR VEIN DEVICE: CPT

## 2021-01-14 PROCEDURE — 36561 INSERT TUNNELED CV CATH: CPT

## 2021-01-14 PROCEDURE — 99157 MOD SED OTHER PHYS/QHP EA: CPT

## 2021-01-14 PROCEDURE — 76937 US GUIDE VASCULAR ACCESS: CPT

## 2021-02-10 ENCOUNTER — HOSPITAL ENCOUNTER (INPATIENT)
Dept: HOSPITAL 8 - ED | Age: 21
LOS: 1 days | Discharge: HOME | DRG: 840 | End: 2021-02-11
Attending: STUDENT IN AN ORGANIZED HEALTH CARE EDUCATION/TRAINING PROGRAM | Admitting: HOSPITALIST
Payer: COMMERCIAL

## 2021-02-10 VITALS — SYSTOLIC BLOOD PRESSURE: 114 MMHG | DIASTOLIC BLOOD PRESSURE: 70 MMHG

## 2021-02-10 VITALS — BODY MASS INDEX: 17.29 KG/M2 | HEIGHT: 66 IN | WEIGHT: 107.59 LBS

## 2021-02-10 DIAGNOSIS — I45.10: ICD-10-CM

## 2021-02-10 DIAGNOSIS — C83.30: Primary | ICD-10-CM

## 2021-02-10 DIAGNOSIS — Z82.49: ICD-10-CM

## 2021-02-10 DIAGNOSIS — B19.10: ICD-10-CM

## 2021-02-10 DIAGNOSIS — J15.9: ICD-10-CM

## 2021-02-10 DIAGNOSIS — Z20.822: ICD-10-CM

## 2021-02-10 DIAGNOSIS — Z79.899: ICD-10-CM

## 2021-02-10 DIAGNOSIS — D84.9: ICD-10-CM

## 2021-02-10 DIAGNOSIS — E78.5: ICD-10-CM

## 2021-02-10 LAB
<PLATELET ESTIMATE>: ADEQUATE
<PLT MORPHOLOGY>: (no result)
ALBUMIN SERPL-MCNC: 3.4 G/DL (ref 3.4–5)
ALP SERPL-CCNC: 113 U/L (ref 45–117)
ALT SERPL-CCNC: 28 U/L (ref 12–78)
ANION GAP SERPL CALC-SCNC: 7 MMOL/L (ref 5–15)
ANISOCYTOSIS BLD QL SMEAR: (no result)
BAND#(MANUAL): 1.05 X10^3/UL
BILIRUB SERPL-MCNC: 0.7 MG/DL (ref 0.2–1)
CALCIUM SERPL-MCNC: 8.8 MG/DL (ref 8.5–10.1)
CHLORIDE SERPL-SCNC: 103 MMOL/L (ref 98–107)
CREAT SERPL-MCNC: 0.72 MG/DL (ref 0.7–1.3)
ERYTHROCYTE [DISTWIDTH] IN BLOOD BY AUTOMATED COUNT: 22.5 % (ref 9.4–14.8)
EST. AVERAGE GLUCOSE BLD GHB EST-MCNC: 91 MG/DL (ref 0–126)
LYMPH#(MANUAL): 0.18 X10^3/UL (ref 1–6.1)
LYMPHS% (MANUAL): 1 % (ref 22–44)
MCH RBC QN AUTO: 27.9 PG (ref 27.5–34.5)
MCHC RBC AUTO-ENTMCNC: 33.2 G/DL (ref 33.2–36.2)
MD: YES
MONOS#(MANUAL): 1.05 X10^3/UL (ref 0.3–2.7)
MONOS% (MANUAL): 6 % (ref 2–9)
NEUTS BAND NFR BLD: 6 % (ref 0–7)
OVALOCYTES BLD QL SMEAR: (no result)
PLATELET # BLD AUTO: 216 X10^3/UL (ref 130–400)
PMV BLD AUTO: 7.9 FL (ref 7.4–10.4)
POLYCHROMASIA BLD QL SMEAR: (no result)
PROT SERPL-MCNC: 6.7 G/DL (ref 6.4–8.2)
RBC # BLD AUTO: 3.63 X10^6/UL (ref 4.38–5.82)
SEG#(MANUAL): 15.23 X10^3/UL (ref 1.8–8)
SEGS% (MANUAL): 87 % (ref 42–75)
T4 FREE SERPL-MCNC: 0.93 NG/DL (ref 0.76–1.46)
TEAR DROPS: (no result)

## 2021-02-10 PROCEDURE — 83036 HEMOGLOBIN GLYCOSYLATED A1C: CPT

## 2021-02-10 PROCEDURE — 83735 ASSAY OF MAGNESIUM: CPT

## 2021-02-10 PROCEDURE — 87040 BLOOD CULTURE FOR BACTERIA: CPT

## 2021-02-10 PROCEDURE — 85025 COMPLETE CBC W/AUTO DIFF WBC: CPT

## 2021-02-10 PROCEDURE — 81001 URINALYSIS AUTO W/SCOPE: CPT

## 2021-02-10 PROCEDURE — 71045 X-RAY EXAM CHEST 1 VIEW: CPT

## 2021-02-10 PROCEDURE — 84550 ASSAY OF BLOOD/URIC ACID: CPT

## 2021-02-10 PROCEDURE — 80061 LIPID PANEL: CPT

## 2021-02-10 PROCEDURE — 36415 COLL VENOUS BLD VENIPUNCTURE: CPT

## 2021-02-10 PROCEDURE — 87635 SARS-COV-2 COVID-19 AMP PRB: CPT

## 2021-02-10 PROCEDURE — 99285 EMERGENCY DEPT VISIT HI MDM: CPT

## 2021-02-10 PROCEDURE — 83605 ASSAY OF LACTIC ACID: CPT

## 2021-02-10 PROCEDURE — 84439 ASSAY OF FREE THYROXINE: CPT

## 2021-02-10 PROCEDURE — 93005 ELECTROCARDIOGRAM TRACING: CPT

## 2021-02-10 PROCEDURE — 80053 COMPREHEN METABOLIC PANEL: CPT

## 2021-02-10 PROCEDURE — 84443 ASSAY THYROID STIM HORMONE: CPT

## 2021-02-10 RX ADMIN — VANCOMYCIN HYDROCHLORIDE SCH MLS/HR: 1 INJECTION, SOLUTION INTRAVENOUS at 22:34

## 2021-02-10 RX ADMIN — ACYCLOVIR SCH MG: 400 TABLET ORAL at 22:32

## 2021-02-10 RX ADMIN — SODIUM CHLORIDE SCH MLS/HR: 0.9 INJECTION, SOLUTION INTRAVENOUS at 22:35

## 2021-02-10 NOTE — NUR
ERP AT BEDSIDE. PT RESTING COMFORTABLY ON GURNEY, NADN, VSS. PT HAS NO NEEDS AT 
THIS TIME. CALL LIGHT AND BELONGINGS WITHIN REACH.

## 2021-02-11 VITALS — SYSTOLIC BLOOD PRESSURE: 94 MMHG | DIASTOLIC BLOOD PRESSURE: 59 MMHG

## 2021-02-11 VITALS — DIASTOLIC BLOOD PRESSURE: 61 MMHG | SYSTOLIC BLOOD PRESSURE: 99 MMHG

## 2021-02-11 VITALS — DIASTOLIC BLOOD PRESSURE: 64 MMHG | SYSTOLIC BLOOD PRESSURE: 101 MMHG

## 2021-02-11 LAB
ALBUMIN SERPL-MCNC: 2.8 G/DL (ref 3.4–5)
ALP SERPL-CCNC: 82 U/L (ref 45–117)
ALT SERPL-CCNC: 24 U/L (ref 12–78)
ANION GAP SERPL CALC-SCNC: 7 MMOL/L (ref 5–15)
BASOPHILS # BLD AUTO: 0.1 X10^3/UL (ref 0–0.3)
BASOPHILS NFR BLD AUTO: 1 % (ref 0–1)
BILIRUB SERPL-MCNC: 0.5 MG/DL (ref 0.2–1)
CALCIUM SERPL-MCNC: 8.4 MG/DL (ref 8.5–10.1)
CHLORIDE SERPL-SCNC: 109 MMOL/L (ref 98–107)
CHOL/HDL RATIO: 2.8
CREAT SERPL-MCNC: 0.54 MG/DL (ref 0.7–1.3)
EOSINOPHIL # BLD AUTO: 0 X10^3/UL (ref 0–0.8)
EOSINOPHIL NFR BLD AUTO: 0 % (ref 1–7)
ERYTHROCYTE [DISTWIDTH] IN BLOOD BY AUTOMATED COUNT: 22.6 % (ref 9.4–14.8)
HDL CHOL %: 35 % (ref 26–37)
HDL CHOLESTEROL (DIRECT): 39 MG/DL (ref 40–60)
LDL CHOLESTEROL,CALCULATED: 55 MG/DL (ref 54–169)
LDLC/HDLC SERPL: 1.4 {RATIO} (ref 0.5–3)
LYMPHOCYTES # BLD AUTO: 0.2 X10^3/UL (ref 1–6.1)
LYMPHOCYTES NFR BLD AUTO: 2 % (ref 22–44)
MCH RBC QN AUTO: 28.8 PG (ref 27.5–34.5)
MCHC RBC AUTO-ENTMCNC: 33.7 G/DL (ref 33.2–36.2)
MD: (no result)
MICROSCOPIC: (no result)
MONOCYTES # BLD AUTO: 0.6 X10^3/UL (ref 0–1.4)
MONOCYTES NFR BLD AUTO: 6 % (ref 2–9)
NEUTROPHILS # BLD AUTO: 9.8 X10^3/UL (ref 1.8–8)
NEUTROPHILS NFR BLD AUTO: 92 % (ref 42–75)
PLATELET # BLD AUTO: 193 X10^3/UL (ref 130–400)
PMV BLD AUTO: 7.7 FL (ref 7.4–10.4)
PROT SERPL-MCNC: 5.7 G/DL (ref 6.4–8.2)
RBC # BLD AUTO: 3.21 X10^6/UL (ref 4.38–5.82)
TRIGL SERPL-MCNC: 86 MG/DL (ref 50–200)
VLDLC SERPL CALC-MCNC: 17 MG/DL (ref 0–25)

## 2021-02-11 RX ADMIN — ACYCLOVIR SCH MG: 400 TABLET ORAL at 08:01

## 2021-02-11 RX ADMIN — SODIUM CHLORIDE SCH MLS/HR: 0.9 INJECTION, SOLUTION INTRAVENOUS at 11:23

## 2021-02-11 RX ADMIN — VANCOMYCIN HYDROCHLORIDE SCH MLS/HR: 1 INJECTION, SOLUTION INTRAVENOUS at 06:21

## 2021-07-28 ENCOUNTER — HOSPITAL ENCOUNTER (OUTPATIENT)
Dept: HOSPITAL 8 - RAD | Age: 21
Discharge: HOME | End: 2021-07-28
Attending: INTERNAL MEDICINE
Payer: COMMERCIAL

## 2021-07-28 DIAGNOSIS — C83.30: Primary | ICD-10-CM

## 2021-07-28 PROCEDURE — 76942 ECHO GUIDE FOR BIOPSY: CPT

## 2021-07-28 PROCEDURE — 87077 CULTURE AEROBIC IDENTIFY: CPT

## 2021-07-28 PROCEDURE — 88305 TISSUE EXAM BY PATHOLOGIST: CPT

## 2021-07-28 PROCEDURE — 87186 SC STD MICRODIL/AGAR DIL: CPT

## 2021-07-28 PROCEDURE — 88112 CYTOPATH CELL ENHANCE TECH: CPT

## 2021-07-28 PROCEDURE — 87205 SMEAR GRAM STAIN: CPT

## 2021-07-28 PROCEDURE — 87070 CULTURE OTHR SPECIMN AEROBIC: CPT

## 2021-07-28 PROCEDURE — 10030 IMG GID FLU COLL DRG SFT TIS: CPT

## 2021-07-28 PROCEDURE — 88304 TISSUE EXAM BY PATHOLOGIST: CPT
